# Patient Record
(demographics unavailable — no encounter records)

---

## 2019-04-12 NOTE — DIAGNOSTIC IMAGING REPORT
EXAMINATION: PA and lateral views of the chest.



COMPARISON: None



CLINICAL HISTORY:  Preadmission, knee pain

     

DISCUSSION:



Lines/tubes:  None.



Lungs:  The lungs are well inflated and clear. No pneumonia or pulmonary edema.



Pleura:  No pleural effusion or pneumothorax.



Heart and mediastinum:  The cardiomediastinal silhouette is normal.



Bones and soft tissues:  No acute bony abnormalities.  



IMPRESSION: 

No acute cardiopulmonary abnormalities.











Signed by: Dr. Andrew Palisch, M.D. on 4/12/2019 3:23 PM

## 2019-04-15 NOTE — XMS REPORT
Patient Summary Document

                             Created on: 04/15/2019



ANIBAL CLEMENT

External Reference #: [id number]

: 1935

Sex: Female



Demographics







                          Address                   [address] [place]  [address]

 

                          Home Phone                [phone]

 

                          Preferred Language        Unknown

 

                          Marital Status            Unknown

 

                          Judaism Affiliation     Unknown

 

                          Race                      Unknown

 

                                        Additional Race(s)  

 

                          Ethnic Group              Unknown





Author







                          Author                    [facility]

 

                          Address                   Unknown

 

                          Phone                     Unavailable







Care Team Providers







                    Care Team Member Name    Role                Phone

 

                    BERNARDO HERNANDEZ     Unavailable         Unavailable







Problems

This patient has no known problems.



Allergies, Adverse Reactions, Alerts

This patient has no known allergies or adverse reactions.



Medications

This patient has no known medications.



Results







           Test Description    Test Time    Test Comments    Text Results    Atomic Results    Result

 Comments

 

                CHEST 2 VIEWS    2019 15:22:00                                                             

                                             [place] [address]  
   Patient Name: ANIBAL CLEMENT                                   MR #: [id number]
                    : 1935                                   Age/Sex: 
83/F  Acct #: [id number]                              Req #: [id number]  [facility] 
Physician:                                                      Ordered by: 
BERNARDO HERNANDEZ MD                            Report #: [id number]        
Location: OR                                      Room/Bed:                     
___________________________________________________________________________________________________
   Procedure: [id number] DX/CHEST 2 VIEWS  Exam Date: 19                   
        Exam Time: 1443                                              REPORT 
STATUS: Signed       EXAMINATION: PA and lateral views of the chest.      
COMPARISON: None      CLINICAL HISTORY:  Preadmission, knee pain           
DISCUSSION:      Lines/tubes:  None.      Lungs:  The lungs are well inflated 
and clear. No pneumonia or pulmonary edema.      Pleura:  No pleural effusion or
pneumothorax.      Heart and mediastinum:  The cardiomediastinal silhouette is 
normal.      Bones and soft tissues:  No acute bony abnormalities.        
IMPRESSION:    No acute cardiopulmonary abnormalities.                  Signed 
by: Dr. Andrew Palisch, M.D. on 2019 3:23 PM        Dictated By: ANDREW R PALISCH MD  Electronically Signed By: ANDREW R PALISCH MD on 19 1523  
Transcribed By: ASTRID on 19 1523       COPY TO:   BERNARDO HERNANDEZ MD

## 2019-04-15 NOTE — DIAGNOSTIC IMAGING REPORT
Right knee-2 views



History: Postoperative radiographs.



Comparison: None. 



Findings: Status post right total knee arthroplasty and patellar resurfacing

with prosthetic components in anatomic alignment.  Overlying subcutaneous

emphysema and surgical skin staples are present.



IMPRESSION:

Status post right knee replacement in anatomic position.



Signed by: Dr. Ariel Graham MD on 4/15/2019 8:56 AM

## 2019-04-15 NOTE — NUR
Report taken from morning rn.assessment done.no resp.distress.no pain voiced.voided in bed 
pan.plexi pump is in place.dressing site is dry and intact.bed locked and in lowest 
position.phone and nilesh light within reach.instructed to call for assistance as needed.

## 2019-04-15 NOTE — OPERATIVE REPORT
DATE OF PROCEDURE:  04/15/2019

 

SURGEON:  Erick العلي MD

 

ASSISTANT:  Ward Vasquez, certified PA.

 

PREOPERATIVE DIAGNOSIS:  Osteoarthritis, right knee.

 

POSTOPERATIVE DIAGNOSIS:  Osteoarthritis, right knee.

 

PROCEDURE:  Right total knee arthroplasty.

 

INDICATIONS:  The patient is an 83-year-old lady with end-stage arthritis of her right

knee.  She has failed conservative management and would like to proceed with a right

total knee replacement.  The risks and benefits have been discussed.  The recovery has

been explained.  She has good family support.  She states she understands and wishes to

proceed. 

 

DESCRIPTION OF PROCEDURE:  The patient was brought to the operating room and placed

under general anesthetic.  She received prophylactic antibiotics, a regional block and

tranexamic acid in the holding area.  Her right lower extremity was prepped and draped

in the sterile manner.  A preoperative time-out was performed.  The extremity was

exsanguinated and a proximal tourniquet was inflated to 300 mmHg.  An anterior approach

with a medial parapatellar arthrotomy was performed.  Clear synovial fluid was removed

from the joint.  Soft tissue releases were performed to bring the knee up into flexion

with the patella everted.  Complete loss of articular cartilage in the medial

compartment was noted.  There was polished subchondral bone.  The meniscal remnants and

marginal osteophytes were removed.  The cruciate ligaments were sacrificed.  A [employer]

[employer] knee system was used.  An extramedullary cutting guide was used to resect

the proximal tibia.  The 9 mm tibial cut was referenced off the unaffected lateral

compartment.  The tibial base plate was a size #2.  The central fin punch was impacted

and attention was directed toward the distal femur.  An intramedullary cutting guide was

used to resect the distal femur in 5 degrees of valgus and rotation referencing off a

combination of landmarks including Whitesides line, the epicondylar axis and the

posterior condyles.  The femoral component was a size #3.  The anterior and posterior

cuts were made.  A 9 mm ultracongruent tibial insert was trialed.  This provided

appropriate soft tissue balancing in full extension and 90 degrees of flexion.  The

patella was resurfaced with a 26 mm x 7.5 mm patellar button.  The thickness was checked

before and after was right around 20 mm.  Patellar tracking was concentric.  The trial

implants were removed.  A 100 mL premixed pericapsular MAURI injection was placed into

the surrounding soft tissue.  The knee was thoroughly irrigated with a shower tip

pulsatile lavage.  The knee had been irrigated multiple times during the case with a

spray bottle with diluted polymyxin and vancomycin spray.  The components were cemented

into place using a single mix of Palacos cement preloaded with antibiotics.  Care was

taken to remove extravasated cement.  The wound was further irrigated while the cement

cured.  500 mg of vancomycin powder was then sprinkled into the joint.  The arthrotomy

was closed with interrupted #1 Ethibond.  The knee was put through flexion and extension

to ensure a secure closure.  The skin was closed with subcuticular Vicryl and staples.

A sterile bandage was 

applied.  The patient was extubated and transferred to the recovery room in stable

condition.  Blood loss was minimal.  All needle and sponge counts were correct. 

 

 

 

 

______________________________

Erick العلي MD DR/DON

D:  04/15/2019 08:32:06

T:  04/15/2019 11:02:41

Job #:  [id number]

## 2019-04-16 NOTE — CONSULTATION
DATE OF CONSULTATION:    

 

REASON FOR CONSULTATION:  Medical management.

 

HISTORY OF PRESENT ILLNESS:  The patient is an 83-year-old lady __________,

osteoarthritis.  She is doing well postoperatively with minimal pain in the right knee. 

 

REVIEW OF SYSTEMS:

She denies on review of systems of any chest pain, fever, chills, nausea, vomiting,

headache, shortness of breath, or dizziness. 

 

PAST MEDICAL HISTORY:  Significant for diabetes, hypertension, hyperlipidemia,

osteoporosis. 

 

MEDICATIONS:  See MAR.

 

ALLERGIES:  NAPROXEN AND PENICILLIN.

 

FAMILY HISTORY:  Noncontributory at this time.

 

SOCIAL HISTORY:  Nonsmoker, nondrinker and .

 

PHYSICAL EXAMINATION:

VITAL SIGNS:  Temperature 98.1, pulse 57, blood pressure 120/60, sats 97% on room air. 

GENERAL:  She is no apparent distress, lying in bed. 

NECK:  Supple. 

LUNGS:  Clear to auscultation bilaterally. 

CARDIOVASCULAR:  Regular rate and rhythm. 

ABDOMEN:  Good bowel sounds.  Soft, nontender. 

EXTREMITIES:  No clubbing or cyanosis. 

NEUROLOGIC:  Nonfocal.

ASSESSMENT/PLAN:  

1. Status post total knee arthroplasty.  Continue with pain control.

2. Anemia.  Check a CBC.

3. Diabetes.  Continue with current care and monitoring.

4. Hyperlipidemia.  We will restart her Livalo.

5. Hypertension.  We will continue with her home medication and monitor. 

Please see hospital chart for full details.

 

 

 

 

______________________________

MD YEISON Baumann/DON

D:  04/16/2019 05:04:20

T:  04/16/2019 06:20:15

Job #:  [id number]

## 2019-04-16 NOTE — NUR
CASE MANAGEMENT ASSESSMENT 

 to bedside to discuss plan of care with patient/family. CM/SW role and care 
transitions discussed. Anticipated discharge plan discussed along with duration of care. 
CM/SW discussed patients right to make decisions in care.  CM/SW work hours given.  

Spoke with pt and her granddaughter Yamilex at bedside 

Patient lives: with her son, but will be staying with her daughter and granddaughter on 
discharge

Admit/Transfer: thru PACU

Hospital/ER visits since last admit: 0

POA/Emergency contact: daughter in law Nupur Hays [phone]

Current/Previous Home Health: none. 

Home health referral was sent to [facility] by Dr. العلي's office prior to pt's 
arrival. CM called and spoke to Clara at [facility] who said they could not accept pt 
due to her insurance. CM informed pt's family at bedside. 

CM also informed Deedee at Dr. العلي's office. She will work on getting a new home health. 
CM asked her to call pt once new home health is set up. Pt will be staying with her daughter 
at [address] [place] [id number]

PCP/Follow-up Care: Will follow up with Dr. العلي as instructed

Current/Previous DME: walker, [facility], CPM delivered to bedside



Medications (referring to index hospitalization or the first time you were in the 
hospital)

a. Were changes made in your medications when you were in the hospital on [date of index 
hospitalization]?  n/a

b. Did you understand the changes? n/a

c. Were you able to obtain your new medications right away? n/a

d. Were you able to take your medications like the doctor wanted you to? n/a

e. Did the hospital give you an accurate, easy to understand list of medications when you 
left? n/a



Scale of 1-10 how comfortable does patient feel with disease management in outpatient 
setting: 10 

Other Services: none

Employment Status: retired 

Areas of Concerns: recent surgery

Referral Needs: home health

Education Needs: medical management, therapy, post operative instructions

IMM/MOON given and signed (if applicable): n/a

Goal for discharge: home with home health



CM/SW left business card at the bedside with contact information. Name and number was also 
written on the patients whiteboard. Patient verbalized understanding of discussion. CM will 
follow-up with ongoing discharge and transition of care needs.

## 2019-04-16 NOTE — NUR
PATIENT DISCHARGED FROM FACILITY. PATIENT GATHERED ALL PERSONAL BELONGINGS INCLUDING 
DISCHARGE INSTRUCTIONS AND FOLLOW UP INFORMATION/PRESCRIPTIONS.

## 2019-04-16 NOTE — NUR
TOOK PATIENT OUT OF CPM AT THIS TIME. PATIENT TOLERATED WELL, NO SIGNS OF DISTRESS. CALL 
LIGHT IN REACH.

## 2019-04-16 NOTE — NUR
RECEIVED PATIENT AND WALKING ROUNDS COMPLETE. PATIENT ON CPM AT THIS TIME. NO SIGNS OF 
DISTRESS CALL LIGHT IN REACH, WILL CONTINUE TO MONITOR.

## 2019-04-17 NOTE — NUR
CM received call from pt's daughter in law Nupur Ho [phone] inquiring about pt's home 
health. CM informed her CM will contact Deedee and get back with her. 

Contacted Deedee and got home health information. 

[facility] [phone]. 

CM called and spoke to Evonne with home health. She stated that they were pending 
authorization, but will be in touch with the family once everything goes thru. 



CM called and updated Nupur Ho.